# Patient Record
Sex: FEMALE | Race: WHITE
[De-identification: names, ages, dates, MRNs, and addresses within clinical notes are randomized per-mention and may not be internally consistent; named-entity substitution may affect disease eponyms.]

---

## 2018-12-12 ENCOUNTER — HOSPITAL ENCOUNTER (EMERGENCY)
Dept: HOSPITAL 8 - ED | Age: 48
Discharge: HOME | End: 2018-12-12
Payer: MEDICAID

## 2018-12-12 VITALS — BODY MASS INDEX: 36.88 KG/M2 | WEIGHT: 208.12 LBS | HEIGHT: 63 IN

## 2018-12-12 VITALS — SYSTOLIC BLOOD PRESSURE: 142 MMHG | DIASTOLIC BLOOD PRESSURE: 85 MMHG

## 2018-12-12 DIAGNOSIS — M25.561: ICD-10-CM

## 2018-12-12 DIAGNOSIS — M79.651: ICD-10-CM

## 2018-12-12 DIAGNOSIS — M25.551: ICD-10-CM

## 2018-12-12 DIAGNOSIS — F32.9: ICD-10-CM

## 2018-12-12 DIAGNOSIS — M79.671: ICD-10-CM

## 2018-12-12 DIAGNOSIS — F41.1: ICD-10-CM

## 2018-12-12 DIAGNOSIS — M25.571: ICD-10-CM

## 2018-12-12 DIAGNOSIS — M54.16: Primary | ICD-10-CM

## 2018-12-12 LAB
ALBUMIN SERPL-MCNC: 3.9 G/DL (ref 3.4–5)
ANION GAP SERPL CALC-SCNC: 6 MMOL/L (ref 5–15)
BASOPHILS # BLD AUTO: 0.03 X10^3/UL (ref 0–0.1)
BASOPHILS NFR BLD AUTO: 0 % (ref 0–1)
CALCIUM SERPL-MCNC: 8.3 MG/DL (ref 8.5–10.1)
CHLORIDE SERPL-SCNC: 109 MMOL/L (ref 98–107)
CREAT SERPL-MCNC: 0.89 MG/DL (ref 0.55–1.02)
EOSINOPHIL # BLD AUTO: 0.31 X10^3/UL (ref 0–0.4)
EOSINOPHIL NFR BLD AUTO: 4 % (ref 1–7)
ERYTHROCYTE [DISTWIDTH] IN BLOOD BY AUTOMATED COUNT: 14.5 % (ref 9.6–15.2)
LYMPHOCYTES # BLD AUTO: 2.23 X10^3/UL (ref 1–3.4)
LYMPHOCYTES NFR BLD AUTO: 27 % (ref 22–44)
MCH RBC QN AUTO: 29.6 PG (ref 27–34.8)
MCHC RBC AUTO-ENTMCNC: 33.6 G/DL (ref 32.4–35.8)
MCV RBC AUTO: 88.2 FL (ref 80–100)
MD: NO
MONOCYTES # BLD AUTO: 0.54 X10^3/UL (ref 0.2–0.8)
MONOCYTES NFR BLD AUTO: 7 % (ref 2–9)
NEUTROPHILS # BLD AUTO: 5.15 X10^3/UL (ref 1.8–6.8)
NEUTROPHILS NFR BLD AUTO: 62 % (ref 42–75)
PLATELET # BLD AUTO: 274 X10^3/UL (ref 130–400)
PMV BLD AUTO: 8 FL (ref 7.4–10.4)
RBC # BLD AUTO: 4.57 X10^6/UL (ref 3.82–5.3)
T4 FREE SERPL-MCNC: 1.01 NG/DL (ref 0.76–1.46)

## 2018-12-12 PROCEDURE — 70360 X-RAY EXAM OF NECK: CPT

## 2018-12-12 PROCEDURE — 82040 ASSAY OF SERUM ALBUMIN: CPT

## 2018-12-12 PROCEDURE — 99284 EMERGENCY DEPT VISIT MOD MDM: CPT

## 2018-12-12 PROCEDURE — 85025 COMPLETE CBC W/AUTO DIFF WBC: CPT

## 2018-12-12 PROCEDURE — 80048 BASIC METABOLIC PNL TOTAL CA: CPT

## 2018-12-12 PROCEDURE — 84439 ASSAY OF FREE THYROXINE: CPT

## 2018-12-12 PROCEDURE — 36415 COLL VENOUS BLD VENIPUNCTURE: CPT

## 2018-12-12 PROCEDURE — 84443 ASSAY THYROID STIM HORMONE: CPT

## 2019-06-03 ENCOUNTER — HOSPITAL ENCOUNTER (EMERGENCY)
Dept: HOSPITAL 8 - ED | Age: 49
Discharge: HOME | End: 2019-06-03
Payer: MEDICAID

## 2019-06-03 VITALS — BODY MASS INDEX: 36.6 KG/M2 | HEIGHT: 63 IN | WEIGHT: 206.57 LBS

## 2019-06-03 VITALS — SYSTOLIC BLOOD PRESSURE: 131 MMHG | DIASTOLIC BLOOD PRESSURE: 52 MMHG

## 2019-06-03 DIAGNOSIS — L03.032: Primary | ICD-10-CM

## 2019-06-03 DIAGNOSIS — F41.1: ICD-10-CM

## 2019-06-03 DIAGNOSIS — I10: ICD-10-CM

## 2019-06-03 DIAGNOSIS — F32.9: ICD-10-CM

## 2019-06-03 PROCEDURE — 99283 EMERGENCY DEPT VISIT LOW MDM: CPT

## 2019-06-03 NOTE — NUR
WOUND CARE COMPLETED. DC EDUCATION PROVIDED, PT DEMONSTRATES UNDERSTANDING. PT 
AMBULATED STEADILY TO DC WITH RN

## 2019-07-17 ENCOUNTER — HOSPITAL ENCOUNTER (EMERGENCY)
Dept: HOSPITAL 8 - ED | Age: 49
Discharge: HOME | End: 2019-07-17
Payer: MEDICAID

## 2019-07-17 VITALS — DIASTOLIC BLOOD PRESSURE: 88 MMHG | SYSTOLIC BLOOD PRESSURE: 144 MMHG

## 2019-07-17 VITALS — WEIGHT: 209.22 LBS | BODY MASS INDEX: 37.07 KG/M2 | HEIGHT: 63 IN

## 2019-07-17 DIAGNOSIS — F32.9: ICD-10-CM

## 2019-07-17 DIAGNOSIS — G89.29: ICD-10-CM

## 2019-07-17 DIAGNOSIS — Z87.891: ICD-10-CM

## 2019-07-17 DIAGNOSIS — F41.1: ICD-10-CM

## 2019-07-17 DIAGNOSIS — M54.9: ICD-10-CM

## 2019-07-17 DIAGNOSIS — L03.032: Primary | ICD-10-CM

## 2019-07-17 PROCEDURE — 99283 EMERGENCY DEPT VISIT LOW MDM: CPT

## 2019-07-17 NOTE — NUR
"MY TOE HAD A ULCER. I THINK ITS MORE INFECTED."   MINIMALLY REDDNED/SWOLLEN. 
NO DISCHARGE

PROVIDER TO BEDSIDE FOR EVAL

## 2021-09-23 ENCOUNTER — HOSPITAL ENCOUNTER (EMERGENCY)
Dept: HOSPITAL 8 - ED | Age: 51
LOS: 1 days | Discharge: HOME | End: 2021-09-24
Payer: MEDICAID

## 2021-09-23 VITALS — WEIGHT: 180.56 LBS | BODY MASS INDEX: 31.99 KG/M2 | HEIGHT: 63 IN

## 2021-09-23 VITALS — SYSTOLIC BLOOD PRESSURE: 102 MMHG | DIASTOLIC BLOOD PRESSURE: 38 MMHG

## 2021-09-23 DIAGNOSIS — I10: ICD-10-CM

## 2021-09-23 DIAGNOSIS — R42: ICD-10-CM

## 2021-09-23 DIAGNOSIS — N30.01: Primary | ICD-10-CM

## 2021-09-23 DIAGNOSIS — N28.9: ICD-10-CM

## 2021-09-23 LAB
ALBUMIN SERPL-MCNC: 3.1 G/DL (ref 3.4–5)
ALP SERPL-CCNC: 84 U/L (ref 45–117)
ALT SERPL-CCNC: 36 U/L (ref 12–78)
ANION GAP SERPL CALC-SCNC: 7 MMOL/L (ref 5–15)
BASOPHILS # BLD AUTO: 0 X10^3/UL (ref 0–0.1)
BASOPHILS NFR BLD AUTO: 0 % (ref 0–1)
BILIRUB SERPL-MCNC: 0.6 MG/DL (ref 0.2–1)
CALCIUM SERPL-MCNC: 8.7 MG/DL (ref 8.5–10.1)
CHLORIDE SERPL-SCNC: 102 MMOL/L (ref 98–107)
CREAT SERPL-MCNC: 2.09 MG/DL (ref 0.55–1.02)
EOSINOPHIL # BLD AUTO: 0.1 X10^3/UL (ref 0–0.4)
EOSINOPHIL NFR BLD AUTO: 2 % (ref 1–7)
ERYTHROCYTE [DISTWIDTH] IN BLOOD BY AUTOMATED COUNT: 14.6 % (ref 9.6–15.2)
LYMPHOCYTES # BLD AUTO: 0.8 X10^3/UL (ref 1–3.4)
LYMPHOCYTES NFR BLD AUTO: 10 % (ref 22–44)
MCH RBC QN AUTO: 29.6 PG (ref 27–34.8)
MCHC RBC AUTO-ENTMCNC: 34.5 G/DL (ref 32.4–35.8)
MICROSCOPIC: (no result)
MONOCYTES # BLD AUTO: 0.7 X10^3/UL (ref 0.2–0.8)
MONOCYTES NFR BLD AUTO: 9 % (ref 2–9)
NEUTROPHILS # BLD AUTO: 6.2 X10^3/UL (ref 1.8–6.8)
NEUTROPHILS NFR BLD AUTO: 79 % (ref 42–75)
PLATELET # BLD AUTO: 223 X10^3/UL (ref 130–400)
PMV BLD AUTO: 7.9 FL (ref 7.4–10.4)
PROT SERPL-MCNC: 7.5 G/DL (ref 6.4–8.2)
RBC # BLD AUTO: 4.16 X10^6/UL (ref 3.82–5.3)

## 2021-09-23 PROCEDURE — 87186 SC STD MICRODIL/AGAR DIL: CPT

## 2021-09-23 PROCEDURE — 85025 COMPLETE CBC W/AUTO DIFF WBC: CPT

## 2021-09-23 PROCEDURE — 81001 URINALYSIS AUTO W/SCOPE: CPT

## 2021-09-23 PROCEDURE — 93005 ELECTROCARDIOGRAM TRACING: CPT

## 2021-09-23 PROCEDURE — 87077 CULTURE AEROBIC IDENTIFY: CPT

## 2021-09-23 PROCEDURE — 36415 COLL VENOUS BLD VENIPUNCTURE: CPT

## 2021-09-23 PROCEDURE — 96360 HYDRATION IV INFUSION INIT: CPT

## 2021-09-23 PROCEDURE — 99284 EMERGENCY DEPT VISIT MOD MDM: CPT

## 2021-09-23 PROCEDURE — 87086 URINE CULTURE/COLONY COUNT: CPT

## 2021-09-23 PROCEDURE — 96361 HYDRATE IV INFUSION ADD-ON: CPT

## 2021-09-23 PROCEDURE — 80053 COMPREHEN METABOLIC PANEL: CPT

## 2021-09-23 NOTE — NUR
PATIENT AMBULATED WITHOUT ASSISTANCE FROM THE LOBBY TO ROOM 6, PATIENT 
ATTEMPTING TO PROVIDE UA AT THIS TIME.